# Patient Record
Sex: MALE | Race: WHITE | NOT HISPANIC OR LATINO | URBAN - METROPOLITAN AREA
[De-identification: names, ages, dates, MRNs, and addresses within clinical notes are randomized per-mention and may not be internally consistent; named-entity substitution may affect disease eponyms.]

---

## 2017-08-08 ENCOUNTER — IMPORTED ENCOUNTER (OUTPATIENT)
Dept: URBAN - METROPOLITAN AREA CLINIC 1 | Facility: CLINIC | Age: 57
End: 2017-08-08

## 2017-08-08 PROBLEM — H52.01: Noted: 2017-08-08

## 2017-08-08 PROBLEM — H52.4: Noted: 2017-08-08

## 2017-08-08 PROCEDURE — S0621 ROUTINE OPHTHALMOLOGICAL EXA: HCPCS

## 2017-08-08 NOTE — PATIENT DISCUSSION
1.  Presbyopia: Rx was given for corrective spectacles if indicated. 2.  Hyperopia: Rx was given for corrective spectacles if indicated. 3.  (NS Carts OU: Observe. )4. Return for an appointment for a 36 in 1 year with Dr. Ann Roberson.

## 2018-11-05 ENCOUNTER — IMPORTED ENCOUNTER (OUTPATIENT)
Dept: URBAN - METROPOLITAN AREA CLINIC 1 | Facility: CLINIC | Age: 58
End: 2018-11-05

## 2018-11-05 PROBLEM — H52.4: Noted: 2018-11-05

## 2018-11-05 PROBLEM — H52.31: Noted: 2018-11-05

## 2018-11-05 PROCEDURE — S0621 ROUTINE OPHTHALMOLOGICAL EXA: HCPCS

## 2018-11-05 NOTE — PATIENT DISCUSSION
1.  Anisometropia OU -- Finalized Glasses MRx was given to patient today for corrective spectacles if indicated2. Presbyopia OU 3. Cataracts OU -- Observe 4. Nasal Pinguecula OS -- Observe / Monitor5. [facility] Return for an appointment in 1 YR for a 36 OU with Dr. Darleen Escoto.

## 2019-07-05 ENCOUNTER — IMPORTED ENCOUNTER (OUTPATIENT)
Dept: URBAN - METROPOLITAN AREA CLINIC 1 | Facility: CLINIC | Age: 59
End: 2019-07-05

## 2019-07-05 PROBLEM — H16.143: Noted: 2019-07-05

## 2019-07-05 PROBLEM — H04.123: Noted: 2019-07-05

## 2019-07-05 PROCEDURE — 92012 INTRM OPH EXAM EST PATIENT: CPT

## 2019-07-05 NOTE — PATIENT DISCUSSION
1.  EDWIN w/ PEK OU- Recommend increase PF ATs Q1H OU routinely and Refresh Celluvisc QHS OU. Cont Lotemax SM BID OU. Consider plugs without improvement 2. Cataract OU - Observe 3. S/p UL and LL Blepharoplasty OU - (Blayne)Return for an appointment in 3 weeks 10 with Dr. Rachel Boo.

## 2019-07-29 ENCOUNTER — IMPORTED ENCOUNTER (OUTPATIENT)
Dept: URBAN - METROPOLITAN AREA CLINIC 1 | Facility: CLINIC | Age: 59
End: 2019-07-29

## 2019-07-29 PROBLEM — H04.123: Noted: 2019-07-29

## 2019-07-29 PROBLEM — H16.143: Noted: 2019-07-29

## 2019-07-29 PROCEDURE — 99213 OFFICE O/P EST LOW 20 MIN: CPT

## 2019-07-29 NOTE — PATIENT DISCUSSION
1.  EDWIN w/ PEK OU- Improved OU. Cont PF ATs Q1-2H OU routinely and Refresh Celluvisc QHS OU. Cont Lotemax SM BID OU until gone (sample given). Consider plugs without improvement 2. Cataract OU - Observe 3. Pinguecula OS 4. S/p UL and LL Blepharoplasty OU - (Blayne)Return for an appointment in 3 months 10 (check ks) with Dr. Micheline Berman.

## 2019-08-29 ENCOUNTER — IMPORTED ENCOUNTER (OUTPATIENT)
Dept: URBAN - METROPOLITAN AREA CLINIC 1 | Facility: CLINIC | Age: 59
End: 2019-08-29

## 2019-08-29 PROBLEM — S05.02XA: Noted: 2019-08-29

## 2019-08-29 PROCEDURE — 92012 INTRM OPH EXAM EST PATIENT: CPT

## 2019-08-29 NOTE — PATIENT DISCUSSION
1.  Central Corneal Abrasion OS (Active) -- Begin Besivance TID OS. Sample given to use until gone. Recommend increasing the use of ATs Q1h OS alternating w/ Soothe.  (sample of Refresh Liquigel given). 2.  EDWIN w/ PEK OU -- Continue ATs Q1-2H OU routinely and Refresh Celluvisc QHS OU. 3.  Cataract OU -- Observe 4. Pinguecula OS 5. S/p UL and LL Blepharoplasty OU - (Cisneros)Return for an appointment in 1 week for a 10 with Dr. Sharyn Hastings.

## 2019-09-05 ENCOUNTER — IMPORTED ENCOUNTER (OUTPATIENT)
Dept: URBAN - METROPOLITAN AREA CLINIC 1 | Facility: CLINIC | Age: 59
End: 2019-09-05

## 2019-09-05 PROBLEM — H16.143: Noted: 2019-09-05

## 2019-09-05 PROBLEM — S05.02XD: Noted: 2019-09-05

## 2019-09-05 PROBLEM — H04.123: Noted: 2019-09-05

## 2019-09-05 PROCEDURE — 92012 INTRM OPH EXAM EST PATIENT: CPT

## 2019-09-05 NOTE — PATIENT DISCUSSION
1.  Central Corneal Abrasion OS -- Resolved. Cont. ATs routinely as recommended for dryness OU. 2.  EDWIN w/ PEK OU -- Continue ATs Q1-2H OU routinely and Refresh Celluvisc QHS OU. 3.  Cataract OU -- Observe. 4.  Pinguecula OS 5. S/p UL and LL Blepharoplasty OU -- (Cisneros)Return as scheduled with Dr. Pooja Cagle.

## 2019-10-29 ENCOUNTER — IMPORTED ENCOUNTER (OUTPATIENT)
Dept: URBAN - METROPOLITAN AREA CLINIC 1 | Facility: CLINIC | Age: 59
End: 2019-10-29

## 2019-10-29 PROBLEM — H16.143: Noted: 2019-10-29

## 2019-10-29 PROBLEM — H04.123: Noted: 2019-10-29

## 2019-10-29 PROBLEM — H25.13: Noted: 2019-10-29

## 2019-10-29 PROCEDURE — 92012 INTRM OPH EXAM EST PATIENT: CPT

## 2019-10-29 NOTE — PATIENT DISCUSSION
1.  EDWIN w/ PEK OU -- Continue ATs Q1-2hrs OU Routinely w/a Cont Refresh Celluvisc QHS OU. Consider Plugs if patient symptomatic or if PEK worsens. 2.  Cataract OU -- Observe. 3.  Pinguecula OS 4. S/p UL and LL Blepharoplasty OU -- (Blayne)Return for an appointment in 6 mo 27 with Dr. Jodie Hutchison.

## 2020-05-08 ENCOUNTER — IMPORTED ENCOUNTER (OUTPATIENT)
Dept: URBAN - METROPOLITAN AREA CLINIC 1 | Facility: CLINIC | Age: 60
End: 2020-05-08

## 2020-05-08 PROBLEM — H11.152: Noted: 2020-05-08

## 2020-05-08 PROBLEM — H25.13: Noted: 2020-05-08

## 2020-05-08 PROBLEM — H04.123: Noted: 2020-05-08

## 2020-05-08 PROBLEM — H16.143: Noted: 2020-05-08

## 2020-05-08 PROCEDURE — 92014 COMPRE OPH EXAM EST PT 1/>: CPT

## 2020-05-08 NOTE — PATIENT DISCUSSION
1.  EDWIN w/ PEK OU- Recommend PF ATs TID-QID OU routinely 2. Cataract OU: Observe for now without intervention. The patient was advised to contact us if any change or worsening of vision3. Pinguecula OS - Use of sunglasses when exposed to UV light and observation is recommended. 4. S/p UL and LL Blepharoplasty OU - (Blayne)Return for an appointment in 1 year 27 with Dr. Aakash Caba.

## 2021-05-07 ENCOUNTER — IMPORTED ENCOUNTER (OUTPATIENT)
Dept: URBAN - METROPOLITAN AREA CLINIC 1 | Facility: CLINIC | Age: 61
End: 2021-05-07

## 2021-05-07 PROBLEM — H16.143: Noted: 2021-05-07

## 2021-05-07 PROBLEM — H04.123: Noted: 2021-05-07

## 2021-05-07 PROBLEM — H25.813: Noted: 2021-05-07

## 2021-05-07 PROBLEM — H11.152: Noted: 2021-05-07

## 2021-05-07 PROCEDURE — 99214 OFFICE O/P EST MOD 30 MIN: CPT

## 2021-05-07 NOTE — PATIENT DISCUSSION
1.  EDWIN w/ PEK OU - Recommend cont PF ATs TID-QID OU routinely. 2.  Cataract OU: Observe for now without intervention. The patient was advised to contact us if any change or worsening of vision3. Pinguecula OS - Use of sunglasses when exposed to UV light and observation is recommended. 4. S/p UL and LL Blepharoplasty OU - Gregory Pill) Patient deferred Manifest Rx today. Return for an appointment in 1 year 27 with Dr. Claudeen Cobble.

## 2022-04-02 ASSESSMENT — VISUAL ACUITY
OS_CC: 20/20
OS_CC: J1
OS_CC: 20/25
OS_CC: 20/25
OS_CC: 20/20
OS_CC: 20/25
OD_SC: 20/20
OD_CC: J1
OS_SC: 20/20
OD_CC: 20/25
OD_CC: 20/20
OD_CC: 20/20-2
OS_SC: 20/20
OS_CC: J1
OS_SC: J2
OD_CC: 20/20
OD_SC: J2
OS_CC: 20/20-1
OD_CC: 20/20-1
OD_CC: 20/25
OS_SC: J2
OS_CC: 20/20
OD_CC: 20/20
OD_SC: 20/20
OD_CC: J1
OS_CC: 20/25-1
OD_SC: J2
OD_CC: 20/25

## 2022-04-02 ASSESSMENT — TONOMETRY
OS_IOP_MMHG: 14
OD_IOP_MMHG: 13
OS_IOP_MMHG: 16
OS_IOP_MMHG: 16
OD_IOP_MMHG: 15
OS_IOP_MMHG: 15
OS_IOP_MMHG: 15
OD_IOP_MMHG: 15
OS_IOP_MMHG: 15
OD_IOP_MMHG: 14
OD_IOP_MMHG: 16
OD_IOP_MMHG: 16
OS_IOP_MMHG: 15
OD_IOP_MMHG: 15
OS_IOP_MMHG: 16
OD_IOP_MMHG: 15

## 2023-03-27 PROBLEM — Z00.00 ENCOUNTER FOR PREVENTIVE HEALTH EXAMINATION: Status: ACTIVE | Noted: 2023-03-27

## 2023-03-29 ENCOUNTER — APPOINTMENT (OUTPATIENT)
Dept: NUCLEAR MEDICINE | Facility: CLINIC | Age: 63
End: 2023-03-29
Payer: SELF-PAY

## 2023-03-29 ENCOUNTER — OUTPATIENT (OUTPATIENT)
Dept: OUTPATIENT SERVICES | Facility: HOSPITAL | Age: 63
LOS: 1 days | End: 2023-03-29

## 2023-03-29 DIAGNOSIS — G25.0 ESSENTIAL TREMOR: ICD-10-CM

## 2023-03-29 PROCEDURE — 78803 RP LOCLZJ TUM SPECT 1 AREA: CPT | Mod: 26

## 2023-07-11 NOTE — PATIENT DISCUSSION
Cataract OU: Observe for now without intervention.  The patient was advised to contact us if any change or worsening of vision [facility]

## 2024-01-30 ENCOUNTER — NON-APPOINTMENT (OUTPATIENT)
Age: 64
End: 2024-01-30